# Patient Record
Sex: FEMALE | Race: WHITE | URBAN - METROPOLITAN AREA
[De-identification: names, ages, dates, MRNs, and addresses within clinical notes are randomized per-mention and may not be internally consistent; named-entity substitution may affect disease eponyms.]

---

## 2017-01-01 ENCOUNTER — MEDICAL CORRESPONDENCE (OUTPATIENT)
Dept: HEALTH INFORMATION MANAGEMENT | Facility: CLINIC | Age: 82
End: 2017-01-01

## 2017-01-01 ENCOUNTER — TELEPHONE (OUTPATIENT)
Dept: FAMILY MEDICINE | Facility: CLINIC | Age: 82
End: 2017-01-01

## 2017-01-01 ENCOUNTER — DOCUMENTATION ONLY (OUTPATIENT)
Dept: CARE COORDINATION | Facility: CLINIC | Age: 82
End: 2017-01-01

## 2017-01-01 DIAGNOSIS — E78.5 HYPERLIPIDEMIA LDL GOAL <130: Primary | ICD-10-CM

## 2017-01-01 RX ORDER — ATORVASTATIN CALCIUM 40 MG/1
40 TABLET, FILM COATED ORAL DAILY
Qty: 90 TABLET | Refills: 0 | Status: SHIPPED | OUTPATIENT
Start: 2017-01-01

## 2017-01-13 NOTE — TELEPHONE ENCOUNTER
Routing refill request to provider for review/approval because:  Labs not current:  [facility]    Alexia López, PharmD  [facility]  On behalf of [facility]

## 2017-01-13 NOTE — TELEPHONE ENCOUNTER
Lipitor       Last Written Prescription Date: 4/25/16  Last Fill Quantity: 90, # refills: 1    Last Office Visit with [facility], [facility] or [facility] prescribing provider:  4/25/16   Future Office Visit:      CHOLESTEROL   Date Value Ref Range Status   09/18/2015 152 <200 mg/dL Final     Comment:     LDL Cholesterol is the primary guide to therapy.   The NCEP recommends further evaluation of: patients with cholesterol greater   than 200 mg/dL if additional risk factors are present, cholesterol greater   than   240 mg/dL, triglycerides greater than 150 mg/dL, or HDL less than 40 mg/dL.       HDL CHOLESTEROL   Date Value Ref Range Status   09/18/2015 49* >50 mg/dL Final     LDL CHOLESTEROL CALCULATED   Date Value Ref Range Status   09/18/2015 62 0 - 129 mg/dL Final     Comment:     LDL Cholesterol is the primary guide to therapy: LDL-cholesterol goal in high   risk patients is <100 mg/dL and in very high risk patients is <70 mg/dL.       TRIGLYCERIDES   Date Value Ref Range Status   09/18/2015 207* 0 - 150 mg/dL Final     Comment:     Fasting specimen     CHOLESTEROL/HDL RATIO   Date Value Ref Range Status   09/18/2015 3.1 0.0 - 5.0 Final     ALT   Date Value Ref Range Status   09/19/2016 20 0 - 50 U/L Final        Naima Ramseysbie    Pharmacy Technician  [facility]

## 2017-01-27 NOTE — TELEPHONE ENCOUNTER
Chloe from home care hosp[facility] is requesting orders to start hospice they would be able to send a team to home in half an hour if orders are given  Please call to advice  Thank you

## 2017-01-27 NOTE — TELEPHONE ENCOUNTER
Chloe said pt was just discharged from [facility].  Family has contacted Hospice and would like them to start soon because pt is not doing well.  Discussed with Dr. Carbajal who gave verbal order to start hospice.  Chloe notified.  To provider to cosign.  Yanelis Guido RN

## 2017-01-27 NOTE — PROGRESS NOTES
[facility] now requests orders and shares plan of care/discharge summaries for some patients through plista.  Please REPLY TO THIS MESSAGE in order to give authorization for orders when needed.  This is considered a verbal order, you will still receive a faxed copy of orders for signature.  Thank you for your assistance in improving collaboration for our patients.    ORDER  Pt admitted to [facility] 1/27/17 with DX Colon Cancer.  Patient would like Dr Moralez to be PCP as pt has most recently have visits with this provider.  If questions or concerns please contact admission clinician listed below.  Thanks    Devorah Hurst RN, BSN  [facility]  [phone] confidential cell  [phone] Office  [email or web address]

## 2017-02-16 ENCOUNTER — MEDICAL CORRESPONDENCE (OUTPATIENT)
Dept: HEALTH INFORMATION MANAGEMENT | Facility: CLINIC | Age: 82
End: 2017-02-16

## 2017-02-16 ENCOUNTER — DOCUMENTATION ONLY (OUTPATIENT)
Dept: CARE COORDINATION | Facility: CLINIC | Age: 82
End: 2017-02-16

## 2017-02-16 NOTE — PROGRESS NOTES
Dear Dr. Carbajal  We are notifying you of a  Hospice Death.  Susan Guo; MRN [id number]   On date 2/15/17  Time 1135PM  Sincerely [facility]  [facility]  [phone]

## 2017-02-22 ENCOUNTER — TELEPHONE (OUTPATIENT)
Dept: FAMILY MEDICINE | Facility: CLINIC | Age: 82
End: 2017-02-22

## 2017-02-22 NOTE — TELEPHONE ENCOUNTER
I called Susan Alva's daughter and gave my condolences. She said they were very appreciative of [facility] team.    Deya,    Can we send a card?    Parag Moralez M.D.

## 2017-02-24 ENCOUNTER — MEDICAL CORRESPONDENCE (OUTPATIENT)
Dept: HEALTH INFORMATION MANAGEMENT | Facility: CLINIC | Age: 82
End: 2017-02-24

## 2017-04-28 ENCOUNTER — DOCUMENTATION ONLY (OUTPATIENT)
Dept: OTHER | Facility: CLINIC | Age: 82
End: 2017-04-28

## 2017-04-28 DIAGNOSIS — Z71.89 ACP (ADVANCE CARE PLANNING): Chronic | ICD-10-CM
